# Patient Record
Sex: FEMALE | NOT HISPANIC OR LATINO | URBAN - METROPOLITAN AREA
[De-identification: names, ages, dates, MRNs, and addresses within clinical notes are randomized per-mention and may not be internally consistent; named-entity substitution may affect disease eponyms.]

---

## 2022-03-29 ENCOUNTER — ANESTHESIA EVENT (OUTPATIENT)
Dept: PERIOP | Facility: HOSPITAL | Age: 47
End: 2022-03-29
Payer: COMMERCIAL

## 2022-03-29 RX ORDER — ARIPIPRAZOLE 2 MG/1
2 TABLET ORAL DAILY
COMMUNITY

## 2022-03-29 RX ORDER — CETIRIZINE HYDROCHLORIDE 10 MG/1
TABLET ORAL
COMMUNITY

## 2022-03-29 RX ORDER — SERTRALINE HYDROCHLORIDE 25 MG/1
25 TABLET, FILM COATED ORAL DAILY
COMMUNITY
Start: 2022-01-19

## 2022-03-29 RX ORDER — UBIDECARENONE 75 MG
CAPSULE ORAL DAILY
Status: ON HOLD | COMMUNITY
End: 2022-04-04

## 2022-03-29 RX ORDER — KETOROLAC TROMETHAMINE 10 MG/1
TABLET, FILM COATED ORAL
COMMUNITY
Start: 2022-03-10

## 2022-03-29 RX ORDER — METOCLOPRAMIDE 10 MG/1
TABLET ORAL
COMMUNITY
Start: 2022-03-03

## 2022-03-29 RX ORDER — POTASSIUM CHLORIDE 20 MEQ/1
20 TABLET, EXTENDED RELEASE ORAL DAILY
Status: ON HOLD | COMMUNITY
End: 2022-04-04 | Stop reason: SINTOL

## 2022-03-29 RX ORDER — HYDROCODONE BITARTRATE AND ACETAMINOPHEN 7.5; 325 MG/1; MG/1
1 TABLET ORAL
COMMUNITY

## 2022-03-29 RX ORDER — OMEPRAZOLE 20 MG/1
20 CAPSULE, DELAYED RELEASE ORAL DAILY
Status: ON HOLD | COMMUNITY
End: 2022-04-04

## 2022-03-29 RX ORDER — ONDANSETRON 4 MG/1
TABLET, ORALLY DISINTEGRATING ORAL
COMMUNITY

## 2022-03-29 RX ORDER — FLUTICASONE PROPIONATE 50 MCG
SPRAY, SUSPENSION (ML) NASAL
COMMUNITY
Start: 2022-02-28

## 2022-03-29 RX ORDER — LISINOPRIL 20 MG/1
20 TABLET ORAL DAILY
COMMUNITY
Start: 2022-01-20

## 2022-03-29 RX ORDER — SEMAGLUTIDE 1.34 MG/ML
INJECTION, SOLUTION SUBCUTANEOUS
COMMUNITY
Start: 2022-02-28

## 2022-03-29 RX ORDER — LEUPROLIDE ACETATE 3.75 MG
3.75 KIT INTRAMUSCULAR
COMMUNITY

## 2022-03-29 RX ORDER — OMEPRAZOLE 40 MG/1
CAPSULE, DELAYED RELEASE ORAL
COMMUNITY
Start: 2022-02-22

## 2022-03-29 RX ORDER — FAMOTIDINE 20 MG/1
20 TABLET, FILM COATED ORAL DAILY
COMMUNITY
Start: 2022-03-10

## 2022-03-29 RX ORDER — SUCRALFATE 1 G/1
1 TABLET ORAL 2 TIMES DAILY
COMMUNITY

## 2022-03-29 RX ORDER — ALPRAZOLAM 1 MG/1
1 TABLET ORAL DAILY PRN
COMMUNITY

## 2022-03-29 RX ORDER — CHOLECALCIFEROL (VITAMIN D3) 50 MCG
1 CAPSULE ORAL DAILY
COMMUNITY
Start: 2022-02-22

## 2022-03-29 RX ORDER — HYDROCHLOROTHIAZIDE 12.5 MG/1
CAPSULE, GELATIN COATED ORAL
COMMUNITY
Start: 2022-01-22

## 2022-04-04 ENCOUNTER — HOSPITAL ENCOUNTER (OUTPATIENT)
Facility: HOSPITAL | Age: 47
Setting detail: OUTPATIENT SURGERY
Discharge: HOME | End: 2022-04-04
Attending: STUDENT IN AN ORGANIZED HEALTH CARE EDUCATION/TRAINING PROGRAM | Admitting: STUDENT IN AN ORGANIZED HEALTH CARE EDUCATION/TRAINING PROGRAM
Payer: COMMERCIAL

## 2022-04-04 ENCOUNTER — ANESTHESIA (OUTPATIENT)
Dept: PERIOP | Facility: HOSPITAL | Age: 47
End: 2022-04-04
Payer: COMMERCIAL

## 2022-04-04 VITALS
DIASTOLIC BLOOD PRESSURE: 91 MMHG | BODY MASS INDEX: 27.99 KG/M2 | HEART RATE: 98 BPM | SYSTOLIC BLOOD PRESSURE: 144 MMHG | TEMPERATURE: 97.7 F | RESPIRATION RATE: 20 BRPM | WEIGHT: 168 LBS | OXYGEN SATURATION: 93 % | HEIGHT: 65 IN

## 2022-04-04 DIAGNOSIS — Z85.3 PERSONAL HISTORY OF MALIGNANT NEOPLASM OF BREAST: ICD-10-CM

## 2022-04-04 DIAGNOSIS — N65.0 DEFORMITY OF RECONSTRUCTED BREAST: ICD-10-CM

## 2022-04-04 PROBLEM — C50.919 BREAST CANCER (HCC): Status: ACTIVE | Noted: 2022-04-04

## 2022-04-04 PROBLEM — K44.9 HIATAL HERNIA: Status: ACTIVE | Noted: 2022-04-04

## 2022-04-04 PROBLEM — N18.9 CHRONIC KIDNEY DISEASE: Status: ACTIVE | Noted: 2022-04-04

## 2022-04-04 PROBLEM — N20.0 NEPHROLITHIASIS: Status: ACTIVE | Noted: 2022-04-04

## 2022-04-04 PROBLEM — Z98.890 PONV (POSTOPERATIVE NAUSEA AND VOMITING): Status: ACTIVE | Noted: 2022-04-04

## 2022-04-04 PROBLEM — R11.2 PONV (POSTOPERATIVE NAUSEA AND VOMITING): Status: ACTIVE | Noted: 2022-04-04

## 2022-04-04 PROBLEM — K21.9 GASTROESOPHAGEAL REFLUX DISEASE: Status: ACTIVE | Noted: 2022-04-04

## 2022-04-04 PROBLEM — F41.9 ANXIETY: Status: ACTIVE | Noted: 2022-04-04

## 2022-04-04 PROBLEM — F32.A DEPRESSION: Status: ACTIVE | Noted: 2022-04-04

## 2022-04-04 PROCEDURE — 88300 SURGICAL PATH GROSS: CPT | Performed by: PATHOLOGY

## 2022-04-04 PROCEDURE — 88302 TISSUE EXAM BY PATHOLOGIST: CPT | Performed by: PATHOLOGY

## 2022-04-04 PROCEDURE — L8600 IMPLANT BREAST SILICONE/EQ: HCPCS | Performed by: STUDENT IN AN ORGANIZED HEALTH CARE EDUCATION/TRAINING PROGRAM

## 2022-04-04 PROCEDURE — C1781 MESH (IMPLANTABLE): HCPCS | Performed by: STUDENT IN AN ORGANIZED HEALTH CARE EDUCATION/TRAINING PROGRAM

## 2022-04-04 DEVICE — IMPLANTABLE DEVICE: Type: IMPLANTABLE DEVICE | Site: BREAST | Status: FUNCTIONAL

## 2022-04-04 RX ORDER — SODIUM CHLORIDE, SODIUM LACTATE, POTASSIUM CHLORIDE, CALCIUM CHLORIDE 600; 310; 30; 20 MG/100ML; MG/100ML; MG/100ML; MG/100ML
100 INJECTION, SOLUTION INTRAVENOUS CONTINUOUS
Status: DISCONTINUED | OUTPATIENT
Start: 2022-04-04 | End: 2022-04-04 | Stop reason: HOSPADM

## 2022-04-04 RX ORDER — EXEMESTANE 25 MG/1
25 TABLET ORAL DAILY
COMMUNITY
Start: 2022-01-06

## 2022-04-04 RX ORDER — FENTANYL CITRATE/PF 50 MCG/ML
50 SYRINGE (ML) INJECTION
Status: DISCONTINUED | OUTPATIENT
Start: 2022-04-04 | End: 2022-04-04 | Stop reason: HOSPADM

## 2022-04-04 RX ORDER — BUPIVACAINE HYDROCHLORIDE 2.5 MG/ML
INJECTION, SOLUTION EPIDURAL; INFILTRATION; INTRACAUDAL AS NEEDED
Status: DISCONTINUED | OUTPATIENT
Start: 2022-04-04 | End: 2022-04-04 | Stop reason: HOSPADM

## 2022-04-04 RX ORDER — DEXAMETHASONE SODIUM PHOSPHATE 10 MG/ML
INJECTION, SOLUTION INTRAMUSCULAR; INTRAVENOUS AS NEEDED
Status: DISCONTINUED | OUTPATIENT
Start: 2022-04-04 | End: 2022-04-04

## 2022-04-04 RX ORDER — CEPHALEXIN 500 MG/1
CAPSULE ORAL
COMMUNITY
Start: 2022-03-27

## 2022-04-04 RX ORDER — LIDOCAINE HYDROCHLORIDE 10 MG/ML
0.5 INJECTION, SOLUTION EPIDURAL; INFILTRATION; INTRACAUDAL; PERINEURAL ONCE AS NEEDED
Status: DISCONTINUED | OUTPATIENT
Start: 2022-04-04 | End: 2022-04-04 | Stop reason: HOSPADM

## 2022-04-04 RX ORDER — SUCCINYLCHOLINE/SOD CL,ISO/PF 100 MG/5ML
SYRINGE (ML) INTRAVENOUS AS NEEDED
Status: DISCONTINUED | OUTPATIENT
Start: 2022-04-04 | End: 2022-04-04

## 2022-04-04 RX ORDER — PROPOFOL 10 MG/ML
INJECTION, EMULSION INTRAVENOUS CONTINUOUS PRN
Status: DISCONTINUED | OUTPATIENT
Start: 2022-04-04 | End: 2022-04-04

## 2022-04-04 RX ORDER — MAGNESIUM HYDROXIDE 1200 MG/15ML
LIQUID ORAL AS NEEDED
Status: DISCONTINUED | OUTPATIENT
Start: 2022-04-04 | End: 2022-04-04 | Stop reason: HOSPADM

## 2022-04-04 RX ORDER — TAMSULOSIN HYDROCHLORIDE 0.4 MG/1
0.4 CAPSULE ORAL DAILY
COMMUNITY
Start: 2022-03-17

## 2022-04-04 RX ORDER — TRAZODONE HYDROCHLORIDE 150 MG/1
150 TABLET ORAL
COMMUNITY
Start: 2022-03-04

## 2022-04-04 RX ORDER — PROPOFOL 10 MG/ML
INJECTION, EMULSION INTRAVENOUS AS NEEDED
Status: DISCONTINUED | OUTPATIENT
Start: 2022-04-04 | End: 2022-04-04

## 2022-04-04 RX ORDER — HYDROMORPHONE HCL/PF 1 MG/ML
0.5 SYRINGE (ML) INJECTION
Status: DISCONTINUED | OUTPATIENT
Start: 2022-04-04 | End: 2022-04-04 | Stop reason: HOSPADM

## 2022-04-04 RX ORDER — ACETAMINOPHEN 325 MG/1
650 TABLET ORAL ONCE AS NEEDED
Status: DISCONTINUED | OUTPATIENT
Start: 2022-04-04 | End: 2022-04-04 | Stop reason: HOSPADM

## 2022-04-04 RX ORDER — ONDANSETRON 2 MG/ML
4 INJECTION INTRAMUSCULAR; INTRAVENOUS ONCE AS NEEDED
Status: DISCONTINUED | OUTPATIENT
Start: 2022-04-04 | End: 2022-04-04 | Stop reason: HOSPADM

## 2022-04-04 RX ORDER — HYDROMORPHONE HCL/PF 1 MG/ML
SYRINGE (ML) INJECTION AS NEEDED
Status: DISCONTINUED | OUTPATIENT
Start: 2022-04-04 | End: 2022-04-04

## 2022-04-04 RX ORDER — SCOLOPAMINE TRANSDERMAL SYSTEM 1 MG/1
1 PATCH, EXTENDED RELEASE TRANSDERMAL
Status: DISCONTINUED | OUTPATIENT
Start: 2022-04-04 | End: 2022-04-04 | Stop reason: HOSPADM

## 2022-04-04 RX ORDER — DEXAMETHASONE SODIUM PHOSPHATE 4 MG/ML
INJECTION, SOLUTION INTRA-ARTICULAR; INTRALESIONAL; INTRAMUSCULAR; INTRAVENOUS; SOFT TISSUE AS NEEDED
Status: DISCONTINUED | OUTPATIENT
Start: 2022-04-04 | End: 2022-04-04 | Stop reason: HOSPADM

## 2022-04-04 RX ORDER — FENTANYL CITRATE 50 UG/ML
INJECTION, SOLUTION INTRAMUSCULAR; INTRAVENOUS AS NEEDED
Status: DISCONTINUED | OUTPATIENT
Start: 2022-04-04 | End: 2022-04-04

## 2022-04-04 RX ORDER — LIDOCAINE HYDROCHLORIDE 10 MG/ML
INJECTION, SOLUTION EPIDURAL; INFILTRATION; INTRACAUDAL; PERINEURAL AS NEEDED
Status: DISCONTINUED | OUTPATIENT
Start: 2022-04-04 | End: 2022-04-04

## 2022-04-04 RX ORDER — POTASSIUM CITRATE 10 MEQ/1
10 TABLET, EXTENDED RELEASE ORAL 2 TIMES DAILY
COMMUNITY
Start: 2021-12-28

## 2022-04-04 RX ORDER — OXYCODONE HYDROCHLORIDE 5 MG/1
5 TABLET ORAL ONCE AS NEEDED
Status: DISCONTINUED | OUTPATIENT
Start: 2022-04-04 | End: 2022-04-04 | Stop reason: HOSPADM

## 2022-04-04 RX ORDER — DIPHENOXYLATE HYDROCHLORIDE AND ATROPINE SULFATE 2.5; .025 MG/1; MG/1
TABLET ORAL
COMMUNITY
Start: 2022-02-28

## 2022-04-04 RX ORDER — METOCLOPRAMIDE HYDROCHLORIDE 5 MG/ML
10 INJECTION INTRAMUSCULAR; INTRAVENOUS ONCE AS NEEDED
Status: DISCONTINUED | OUTPATIENT
Start: 2022-04-04 | End: 2022-04-04 | Stop reason: HOSPADM

## 2022-04-04 RX ORDER — FENTANYL CITRATE/PF 50 MCG/ML
25 SYRINGE (ML) INJECTION
Status: DISCONTINUED | OUTPATIENT
Start: 2022-04-04 | End: 2022-04-04 | Stop reason: HOSPADM

## 2022-04-04 RX ORDER — DIPHENHYDRAMINE HYDROCHLORIDE 50 MG/ML
12.5 INJECTION INTRAMUSCULAR; INTRAVENOUS ONCE AS NEEDED
Status: DISCONTINUED | OUTPATIENT
Start: 2022-04-04 | End: 2022-04-04 | Stop reason: HOSPADM

## 2022-04-04 RX ORDER — CEFAZOLIN SODIUM 1 G/50ML
1000 SOLUTION INTRAVENOUS ONCE
Status: COMPLETED | OUTPATIENT
Start: 2022-04-04 | End: 2022-04-04

## 2022-04-04 RX ORDER — GABAPENTIN 300 MG/1
300 CAPSULE ORAL 2 TIMES DAILY
COMMUNITY

## 2022-04-04 RX ORDER — MIDAZOLAM HYDROCHLORIDE 2 MG/2ML
INJECTION, SOLUTION INTRAMUSCULAR; INTRAVENOUS AS NEEDED
Status: DISCONTINUED | OUTPATIENT
Start: 2022-04-04 | End: 2022-04-04

## 2022-04-04 RX ORDER — ONDANSETRON 2 MG/ML
INJECTION INTRAMUSCULAR; INTRAVENOUS AS NEEDED
Status: DISCONTINUED | OUTPATIENT
Start: 2022-04-04 | End: 2022-04-04

## 2022-04-04 RX ADMIN — PROPOFOL 100 MCG/KG/MIN: 10 INJECTION, EMULSION INTRAVENOUS at 12:20

## 2022-04-04 RX ADMIN — SCOPALAMINE 1 PATCH: 1 PATCH, EXTENDED RELEASE TRANSDERMAL at 12:02

## 2022-04-04 RX ADMIN — REMIFENTANIL HYDROCHLORIDE 0.2 MCG/KG/MIN: 1 INJECTION, POWDER, LYOPHILIZED, FOR SOLUTION INTRAVENOUS at 15:01

## 2022-04-04 RX ADMIN — ONDANSETRON 4 MG: 2 INJECTION INTRAMUSCULAR; INTRAVENOUS at 15:49

## 2022-04-04 RX ADMIN — FENTANYL CITRATE 100 MCG: 50 INJECTION, SOLUTION INTRAMUSCULAR; INTRAVENOUS at 12:14

## 2022-04-04 RX ADMIN — CEFAZOLIN SODIUM 1000 MG: 1 SOLUTION INTRAVENOUS at 12:07

## 2022-04-04 RX ADMIN — Medication 100 MG: at 12:19

## 2022-04-04 RX ADMIN — SODIUM CHLORIDE, SODIUM LACTATE, POTASSIUM CHLORIDE, AND CALCIUM CHLORIDE: .6; .31; .03; .02 INJECTION, SOLUTION INTRAVENOUS at 15:58

## 2022-04-04 RX ADMIN — DEXAMETHASONE SODIUM PHOSPHATE 10 MG: 10 INJECTION, SOLUTION INTRAMUSCULAR; INTRAVENOUS at 12:20

## 2022-04-04 RX ADMIN — PROPOFOL 200 MG: 10 INJECTION, EMULSION INTRAVENOUS at 12:19

## 2022-04-04 RX ADMIN — MIDAZOLAM 2 MG: 1 INJECTION INTRAMUSCULAR; INTRAVENOUS at 12:10

## 2022-04-04 RX ADMIN — FENTANYL CITRATE 50 MCG: 50 INJECTION, SOLUTION INTRAMUSCULAR; INTRAVENOUS at 16:24

## 2022-04-04 RX ADMIN — PROPOFOL 50 MG: 10 INJECTION, EMULSION INTRAVENOUS at 12:27

## 2022-04-04 RX ADMIN — PROPOFOL 50 MG: 10 INJECTION, EMULSION INTRAVENOUS at 13:28

## 2022-04-04 RX ADMIN — REMIFENTANIL HYDROCHLORIDE 0.1 MCG/KG/MIN: 1 INJECTION, POWDER, LYOPHILIZED, FOR SOLUTION INTRAVENOUS at 12:42

## 2022-04-04 RX ADMIN — PROPOFOL 50 MG: 10 INJECTION, EMULSION INTRAVENOUS at 13:52

## 2022-04-04 RX ADMIN — PROPOFOL 150 MCG/KG/MIN: 10 INJECTION, EMULSION INTRAVENOUS at 13:55

## 2022-04-04 RX ADMIN — SODIUM CHLORIDE, SODIUM LACTATE, POTASSIUM CHLORIDE, AND CALCIUM CHLORIDE: .6; .31; .03; .02 INJECTION, SOLUTION INTRAVENOUS at 12:15

## 2022-04-04 RX ADMIN — PROPOFOL 50 MG: 10 INJECTION, EMULSION INTRAVENOUS at 14:04

## 2022-04-04 RX ADMIN — HYDROMORPHONE HYDROCHLORIDE 0.5 MG: 1 INJECTION, SOLUTION INTRAMUSCULAR; INTRAVENOUS; SUBCUTANEOUS at 12:29

## 2022-04-04 RX ADMIN — HYDROMORPHONE HYDROCHLORIDE 0.5 MG: 1 INJECTION, SOLUTION INTRAMUSCULAR; INTRAVENOUS; SUBCUTANEOUS at 16:44

## 2022-04-04 RX ADMIN — FENTANYL CITRATE 50 MCG: 50 INJECTION, SOLUTION INTRAMUSCULAR; INTRAVENOUS at 16:33

## 2022-04-04 RX ADMIN — SODIUM CHLORIDE, SODIUM LACTATE, POTASSIUM CHLORIDE, AND CALCIUM CHLORIDE: .6; .31; .03; .02 INJECTION, SOLUTION INTRAVENOUS at 14:11

## 2022-04-04 RX ADMIN — CEFAZOLIN SODIUM 1000 MG: 1 SOLUTION INTRAVENOUS at 12:15

## 2022-04-04 RX ADMIN — FENTANYL CITRATE 100 MCG: 50 INJECTION, SOLUTION INTRAMUSCULAR; INTRAVENOUS at 12:29

## 2022-04-04 RX ADMIN — HYDROMORPHONE HYDROCHLORIDE 0.5 MG: 1 INJECTION, SOLUTION INTRAMUSCULAR; INTRAVENOUS; SUBCUTANEOUS at 16:58

## 2022-04-04 RX ADMIN — LIDOCAINE HYDROCHLORIDE 50 MG: 10 INJECTION, SOLUTION EPIDURAL; INFILTRATION; INTRACAUDAL; PERINEURAL at 12:19

## 2022-04-04 RX ADMIN — PROPOFOL 50 MG: 10 INJECTION, EMULSION INTRAVENOUS at 12:24

## 2022-04-04 NOTE — ANESTHESIA POSTPROCEDURE EVALUATION
Post-Op Assessment Note    CV Status:  Stable  Pain Score: 5    Pain management: adequate     Mental Status:  Alert and awake   Hydration Status:  Euvolemic   PONV Controlled:  Controlled   Airway Patency:  Patent      Post Op Vitals Reviewed: Yes      Staff: Anesthesiologist         No complications documented      /85 (04/04/22 1617)    Temp 99 6 °F (37 6 °C) (04/04/22 1617)    Pulse (!) 109 (04/04/22 1617)   Resp 20 (04/04/22 1617)    SpO2 94 % (04/04/22 1617)

## 2022-04-04 NOTE — OP NOTE
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]     * Personal history of malignant neoplasm of breast [Z85 3]    Procedure(s) (LRB):  BREAST RECONSTRUCTION REVISION, NEW POCKET PLACEMENT OF IMPLANT, CAPSULORRHAPY, GALAFLEX (Bilateral)    Specimen(s):  ID Type Source Tests Collected by Time Destination   1 : RIGHT BREAST IMPLANT  Other Surgical Hardware/Implant TISSUE EXAM Sarah Badillo, DO 2022 1244    2 : LEFT BREAST IMPLANT Other Surgical Hardware/Implant TISSUE EXAM Andei Crisostomo, DO 2022 1247    3 : LEFT BREAST TISSUE Tissue Breast, Left TISSUE EXAM Andie Crisostomo, DO 2022 1308    4 : RIGHT BREAST TISSUE Tissue Breast, Right TISSUE EXAM Andie Crisostomo, DO 2022 1424        Estimated Blood Loss:   50 mL    Drains:  Closed/Suction Drain Left Breast Bulb 10 Fr  (Active)   Drainage Appearance Bloody 22 1617   Status To bulb suction 22 1617   Number of days: 0       Closed/Suction Drain Right Breast Bulb 10 Fr  (Active)   Drainage Appearance Bloody 22 1617   Status To bulb suction 22 1617   Number of days: 0       [REMOVED] Urethral Catheter Latex 16 Fr  (Removed)   Number of days: 0       Anesthesia Type:   General, TIVA    Operative Indications:  51 yo female presents with asymmetry and deformity of her reconstructed breasts    Operative Findings:  590 cc sientra and 470 cc sientra implants removed from left and right respectively; capsulectomy performed; galaflex to reinforce the IMF and breast borders; implants moved to prepectoral plane    Right breast allergan natrelle smooth inspira 545cc SN [id number]  Left breast allergan natrelle smooth inspira 615 cc SN [id number]    Complications:   None    Procedure and Technique:  The patient was seen preoperatively   The procedure, risks, benefits and alternatives were discussed   Informed consent was obtained   The patient was site marked preoperatively   The patient was brought to the operating room where she was positioned supine with all of her pressure points appropriately padded   Anesthesia commenced   A timeout was performed and verified      The patient was prepped and draped in usual sterile fashion   I first turned my attention to the left side   The marked former incision was excised and the subcutaneous tissue was dissected down to the pectoralis muscle  A prepectoral plane was cautiously dissected  The skin flaps appeared healthy and well perfused   Next, the implant was removed   The capsule was soft and without any obvious abnormality   I first performed capsulectomy and partial capsulotomy and used 2-0 PDS and the capsule to reinforce the inframammary fold and lateral breast border and well as reattach the pectoralis muscle into position   Next, the galaflex mesh was sewn in medially, inferiorly and laterally using 2-0 PDS   A sizer was used during this process to ensure appropriate position and size   The patient was then sat up and the excess skin was tailor tacked  Salarianna Hansen was returned supine and the excess skin was excised   The pocket was copiously irrigated and hemostasis was obtained   Under sterile conditions, the final implant was placed    The superior border of the galaflex was tacked to the skin flap using 3-0 monocryl   A 10 Fr drain was placed between the skin flaps and galaflex and secured in place using 2-0 nylon   The superior skin flap was de-epithelialized and the excess soft tissue was used to buttress the IMF  The deep dermis and skin were approximated using 3-0 monocryl  and 4-0 PDS   The same procedure was performed on the right   The patient's skin was cleansed, dried and glue applied   A biopatch and tegaderm were placed around the drain   Fluffs and my surgical bra were applied      The instrument, sponge and needle count was correct an verified prior to completion of the case      The patient emerged from anesthesia and was transferred to the recovery room in stable condition  Patient Disposition:  PACU       SIGNATURE: Jose Willams DO  DATE: April 4, 2022  TIME: 4:43 PM    No qualified resident was available for the case  The PA provided assistance with retraction and suturing

## 2022-04-04 NOTE — INTERVAL H&P NOTE
H&P reviewed  After examining the patient I find no changes in the patients condition since the H&P had been written      Vitals:    04/04/22 0959   BP: 138/84   Pulse: 96   Resp: 20   Temp: 98 9 °F (37 2 °C)   SpO2: 99%

## 2022-04-04 NOTE — ANESTHESIA PREPROCEDURE EVALUATION
Procedure:  BREAST RECONSTRUCTION REVISION, NEW POCKET PLACEMENT OF IMPLANT, CAPSULORRHAPY, GALAFLEX (Bilateral Breast)    Relevant Problems   ANESTHESIA   (+) PONV (postoperative nausea and vomiting)      GI/HEPATIC   (+) Gastroesophageal reflux disease   (+) Hiatal hernia      /RENAL   (+) Chronic kidney disease   (+) Nephrolithiasis      GYN   (+) Breast cancer (HCC)      NEURO/PSYCH   (+) Anxiety   (+) Depression     Cr 3/16/22: 1 0     PFT 11/3/21: The FVC is normal  The FEV1 is normal  The FEV1/FVC is normal  There is no   significant bronchodilator response  The total lung capacity is increased  The    diffusing capacity is normal INTERPRETATION: Spirometry is normal  There is   no significant bronchodilator response  The total lung capacity is increased  The normal diffusing capacity indicates that gas exchange is preserved  This   interpretation has been electronically signed: Jade Arana DR  12/08/2021     09:09:28 AM    Physical Exam    Airway    Mallampati score: II  TM Distance: >3 FB  Neck ROM: full     Dental       Cardiovascular      Pulmonary      Other Findings        Anesthesia Plan  ASA Score- 2     Anesthesia Type- general with ASA Monitors  Additional Monitors:   Airway Plan: ETT  Plan Factors-Exercise tolerance (METS): >4 METS  Chart reviewed  Existing labs reviewed  Patient summary reviewed  Patient is not a current smoker  Patient did not smoke on day of surgery  Induction- intravenous  Postoperative Plan- Plan for postoperative opioid use  Planned trial extubation    Informed Consent- Anesthetic plan and risks discussed with patient  I personally reviewed this patient with the CRNA  Discussed and agreed on the Anesthesia Plan with the CRNA  Kayy Cunningham

## 2022-04-04 NOTE — ANESTHESIA POSTPROCEDURE EVALUATION
Post-Op Assessment Note    CV Status:  Stable  Pain Score: 0    Pain management: adequate     Mental Status:  Alert   PONV Controlled:  Controlled   Airway Patency:  Patent   Two or more mitigation strategies used for obstructive sleep apnea   Post Op Vitals Reviewed: Yes      Staff: CRNA         No complications documented      /85 (04/04/22 1617)    Temp 99 6 °F (37 6 °C) (04/04/22 1617)    Pulse (!) 109 (04/04/22 1617)   Resp 20 (04/04/22 1617)    SpO2 94 % (04/04/22 1617)

## 2022-04-04 NOTE — DISCHARGE INSTR - AVS FIRST PAGE
Surgery Date: 4/4/2022                Patient: Holger Cook  Surgeon: Frandy Calderón, DO     Postoperative Instructions   Breast Reconstruction     Dressings:  [x] Skin glue was applied to your incision over absorbable sutures  You may feel small pieces of suture at the ends of your incision  [x] Remove dressing the second morning following your surgery and bathe as directed  Please leave tegaderm (clear dressing over your drains) in place  [x] No dressings are required but you may cover the incision with gauze for comfort  [x] Wear your surgical bra at all times except while showering  [x] Strip and record your RAIANA drain output as instructed  Be sure to bring the output log to your follow up appointment  [] Other instructions:      Bathing:  [x] Shower 48 hours after surgery  Allow soap and water to gently wash over the incision  No scrubbing  Gently pat dry and apply dressing as needed/instructed above  [x] No submerging incision in bathtub, pool, hot tub and/or lake  Activity:  [x] No heavy lifting (> 10lbs)  [x] No strenuous exercise  [x] Walking is mandatory daily  [x] Sleeping may be more comfortable with your head elevated  [x] No driving until off pain medications and you have resumed full range of motion  Diet and Medication:  [x] Resume diet as tolerated  High protein diet is important for healing  Remain well hydrated with water and minimize sodium intake  [x] Resume preoperative medications  [x] Pain medications as prescribed  You may also begin to use ibuprofen 48 hours after surgery  [x] Finish all antibiotics as prescribed  [x] Apply ice to area as needed for pain  Do not place ice directly on skin  Do not use heat  [] Other instructions: It is expected to have some bruising, swelling and mild oozing at the incision site and of the surrounding area    If there is more than you expect, an enlarging area or you suspect an infection, please call the office  Some patients may experience a low-grade fever after surgery  If it is above 100 4, please call the office  If you do not have a postoperative office appointment scheduled, please call the office today and let the staff know Dr Anastasiia West needs to see you in 5-7 days  Please call [phone] with any questions, concerns or changes        ARIANA Drain Output Log     Date Time Drain #1 Drain #2

## 2022-09-21 ENCOUNTER — COSMETIC (OUTPATIENT)
Dept: PLASTIC SURGERY | Facility: CLINIC | Age: 47
End: 2022-09-21
Payer: COMMERCIAL

## 2022-09-21 DIAGNOSIS — N65.0 DEFORMITY OF RECONSTRUCTED BREAST: ICD-10-CM

## 2022-09-21 DIAGNOSIS — Z41.1 ENCOUNTER FOR COSMETIC SURGERY: Primary | ICD-10-CM

## 2022-09-21 PROCEDURE — 99214 OFFICE O/P EST MOD 30 MIN: CPT | Performed by: STUDENT IN AN ORGANIZED HEALTH CARE EDUCATION/TRAINING PROGRAM

## 2022-09-21 RX ORDER — LISINOPRIL 10 MG/1
TABLET ORAL
COMMUNITY
Start: 2022-09-07

## 2022-09-21 RX ORDER — PHENTERMINE HYDROCHLORIDE 15 MG/1
15 CAPSULE ORAL DAILY
COMMUNITY
Start: 2022-08-16

## 2022-09-21 RX ORDER — ALBUTEROL SULFATE 2.5 MG/3ML
2.5 SOLUTION RESPIRATORY (INHALATION)
COMMUNITY
Start: 2021-10-12 | End: 2022-10-12

## 2022-09-21 RX ORDER — DEXAMETHASONE 4 MG/1
TABLET ORAL
COMMUNITY
Start: 2022-07-11

## 2022-09-21 NOTE — PROGRESS NOTES
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  I am unable to address this  My hope is that a diastasis repair will improve upon this contour but the more weight loss she obtains, the better her results will be  I also explained that given her prior excision from her abdomen as well as liposuction for fat grafting, she has a high risk of a vertical incision as well as contour irregularities  Will provide a quote for the cosmetic portion of the procedure and will aim for a goal surgery date of mid-December  History of Present Illness:   Ms Kenia Moy is a 50 y o  female well known to me  I performed a revision breast reconstruction as she had a prior recons x 14 by another surgeon  At one point, she had a fat transfer but this surgeon also performed was seems to be a mini-abdominoplasty as she has a transverse incision  She is unhappy with the contour of her abdomen  She is a non-nicotine user  Review of Systems:  A 12 point ROS was performed and negative except per HPI  Past Medical History:  Past Medical History:   Diagnosis Date    Anxiety     Cancer [facility])     breast    Chronic kidney disease     Depression     Gastric dysmotility     delayed gastric emptying    GERD (gastroesophageal reflux disease)     Hiatal hernia     Hypertension     Kidney stone     Lymphedema     PONV (postoperative nausea and vomiting)     PPD positive     +PPD test history   travel to [facility]       Past Surgical History:  Past Surgical History:   Procedure Laterality Date    BREAST RECONSTRUCTION      x14    BREAST RECONSTRUCTION Bilateral 4/4/2022    Procedure: BREAST RECONSTRUCTION REVISION, NEW POCKET PLACEMENT OF IMPLANT, CAPSULORRHAPY, GALAFLEX;  Surgeon: Romayne Russell, DO;  Location:  MAIN OR;  Service: Plastics    CHOLECYSTECTOMY      COSMETIC SURGERY      CYSTOSCOPY W/ URETEROSCOPY W/ LITHOTRIPSY      EGD      MASTECTOMY Bilateral        Social History:  Social History     Tobacco Use    Smoking status: Never Smoker    Smokeless tobacco: Never Used   Vaping Use    Vaping Use: Never used   Substance Use Topics    Alcohol use: Not Currently    Drug use: Never       Family History:  Family History   Problem Relation Age of Onset    Stroke Mother     Heart attack Father        Allergies: Allergies   Allergen Reactions    Other Other (See Comments)     Allergic Chloraprep blisters    Medical Tape Blisters and Rash     Can use paper tape    Iodinated Diagnostic Agents Hives    Chlorhexidine Rash       Medications:  Current Outpatient Medications on File Prior to Visit   Medication Sig Dispense Refill    albuterol (2 5 mg/3 mL) 0 083 % nebulizer solution Inhale 2 5 mg      ALPRAZolam (XANAX) 1 mg tablet Take 1 mg by mouth daily as needed      ARIPiprazole (ABILIFY) 2 mg tablet Take 2 mg by mouth daily      cephalexin (KEFLEX) 500 mg capsule       cetirizine (ZyrTEC) 10 mg tablet cetirizine 10 mg tablet   TAKE 1 TABLET EVERY DAY BY ORAL ROUTE AS DIRECTED FOR 90 DAYS   D3 Super Strength 50 MCG (2000 UT) CAPS Take 1 capsule by mouth daily As directed      denosumab (PROLIA) 60 mg/mL Inject 60 mg under the skin      dexamethasone (DECADRON) 4 mg tablet TAKE 1 TABLET BY MOUTH EVERY DAY AS DIRECTED FOR 5 DAYS      diphenoxylate-atropine (LOMOTIL) 2 5-0 025 mg per tablet TAKE 1 TABLETS 4 TIMES A DAY BY ORAL ROUTE AS DIRECTED FOR 90 DAYS        exemestane (AROMASIN) 25 MG tablet Take 25 mg by mouth daily      famotidine (PEPCID) 20 mg tablet Take 20 mg by mouth daily      fluticasone (FLONASE) 50 mcg/act nasal spray SPRAY 2 SPRAYS EVERY DAY BY INTRANASAL ROUTE AS DIRECTED FOR 30 DAYS   gabapentin (NEURONTIN) 300 mg capsule Take 300 mg by mouth 2 (two) times a day      hydrochlorothiazide (MICROZIDE) 12 5 mg capsule TAKE 1 TAB ORALLY DAILY      HYDROcodone-acetaminophen (NORCO) 7 5-325 mg per tablet Take 1 tablet by mouth      ketorolac (TORADOL) 10 mg tablet TAKE 1 TABLET BY MOUTH EVERY 8 HOURS FOR 5 DAYS      leuprolide (Lupron Dep[place], 1-Month,) 3 75 mg injection Inject 3 75 mg into a muscle      lisinopril (ZESTRIL) 10 mg tablet       lisinopril (ZESTRIL) 20 mg tablet Take 20 mg by mouth daily As directed      metoclopramide (REGLAN) 10 mg tablet TAKE 1 TABLET 4 TIMES A DAY BY ORAL ROUTE AS DIRECTED FOR 90 DAYS   omeprazole (PriLOSEC) 40 MG capsule TAKE 1 CAPSULE BY MOUTH EVERY DAY AS DIRECTED FOR 90 DAYS      ondansetron (ZOFRAN-ODT) 4 mg disintegrating tablet PLACE 1 TAB ON TONGUE EVERY 8 HOURS AS NEEDED FOR NAUSEA FOR UP TO 30 DAYS  DISSOLVE ON TONGUE       Ozempic, 0 25 or 0 5 MG/DOSE, 2 MG/1 5ML SOPN INJECT 0 25 MG EVERY WEEK BY SUBCUTANEOUS ROUTE AS DIRECTED   phentermine 15 MG capsule Take 15 mg by mouth daily      potassium citrate (UROCIT-K) 10 mEq Take 10 mEq by mouth 2 (two) times a day      sertraline (ZOLOFT) 25 mg tablet Take 25 mg by mouth daily      sucralfate (CARAFATE) 1 g tablet Take 1 g by mouth 2 (two) times a day      tamsulosin (FLOMAX) 0 4 mg Take 0 4 mg by mouth daily      traZODone (DESYREL) 150 mg tablet Take 150 mg by mouth daily at bedtime       No current facility-administered medications on file prior to visit  Physical Examination:  There were no vitals taken for this visit  Estimated body mass index is 27 96 kg/m² as calculated from the following:    Height as of 4/4/22: 5' 5" (1 651 m)  Weight as of 4/4/22: 76 2 kg (168 lb)    General: NAD, well appearing, AAOx3  HEENT: NCAT, EOMI, MMM, supple  Resp: Nonlabored  Heart: RRR  Abdomen: Soft, intra-abdominal distension, well healed transverse and laparoscopic incisions  Extremities/MSK: no LE edema, no obvious deficits in ROM  Neuro: grossly intact with no obvious deficits  Skin: no obvious lesions or rashes  Breast: no palpable mass, no palpable axillary lymphadenopathy, implants soft, small standing cone deformities of medial incisions    Andie Crisostomo, DO  Plastic and Reconstructive Surgery    I have spent 30 minutes with Patient  today in which greater than 50% of this time was spent in counseling/coordination of care regarding Prognosis, Risks and benefits of tx options, Importance of tx compliance, Risk factor reductions and Impressions

## 2022-10-06 ENCOUNTER — PREP FOR PROCEDURE (OUTPATIENT)
Dept: PLASTIC SURGERY | Facility: CLINIC | Age: 47
End: 2022-10-06

## 2022-10-06 DIAGNOSIS — N65.0 DEFORMITY OF RECONSTRUCTED BREAST: ICD-10-CM

## 2022-10-06 DIAGNOSIS — Z41.1 ENCOUNTER FOR COSMETIC SURGERY: Primary | ICD-10-CM

## 2022-10-06 DIAGNOSIS — Z85.3 PERSONAL HISTORY OF MALIGNANT NEOPLASM OF BREAST: ICD-10-CM

## 2022-11-09 ENCOUNTER — OFFICE VISIT (OUTPATIENT)
Dept: PLASTIC SURGERY | Facility: CLINIC | Age: 47
End: 2022-11-09

## 2022-11-09 DIAGNOSIS — Z41.1 ENCOUNTER FOR COSMETIC SURGERY: Primary | ICD-10-CM

## 2022-11-29 NOTE — PROGRESS NOTES
Patient seen and examined  She understands that due to her intra-abdominal lipodystrophy, she still may have a more protuberant appearance  She also understands that her prior operations will effect the mobility of her tissue and this may require a vertical component of her incision  We discussed the procedure, risks, benefits, alternatives and postoperative instructions and expectations  Informed consent obtained  All the patient's questions were answered and she voiced understanding  Andie Crisostomo, DO  Plastic and Reconstructive Surgery

## 2022-12-06 ENCOUNTER — ANESTHESIA EVENT (OUTPATIENT)
Dept: PERIOP | Facility: HOSPITAL | Age: 47
End: 2022-12-06

## 2022-12-06 DIAGNOSIS — Z41.1 ELECTIVE PROCEDURE FOR UNACCEPTABLE COSMETIC APPEARANCE: Primary | ICD-10-CM

## 2022-12-06 RX ORDER — GABAPENTIN 300 MG/1
300 CAPSULE ORAL 3 TIMES DAILY
Qty: 15 CAPSULE | Refills: 0 | Status: SHIPPED | OUTPATIENT
Start: 2022-12-06 | End: 2022-12-11

## 2022-12-06 RX ORDER — TRAMADOL HYDROCHLORIDE 50 MG/1
50 TABLET ORAL EVERY 6 HOURS PRN
Qty: 20 TABLET | Refills: 0 | Status: SHIPPED | OUTPATIENT
Start: 2022-12-06

## 2022-12-06 RX ORDER — SENNOSIDES 8.6 MG
650 CAPSULE ORAL EVERY 6 HOURS
Qty: 20 TABLET | Refills: 0 | Status: SHIPPED | OUTPATIENT
Start: 2022-12-06 | End: 2022-12-11

## 2022-12-06 RX ORDER — OXYCODONE HYDROCHLORIDE 5 MG/1
5 TABLET ORAL EVERY 6 HOURS PRN
Qty: 10 TABLET | Refills: 0 | Status: SHIPPED | OUTPATIENT
Start: 2022-12-06

## 2022-12-06 RX ORDER — IBUPROFEN 800 MG/1
800 TABLET ORAL EVERY 8 HOURS PRN
Qty: 15 TABLET | Refills: 0 | Status: SHIPPED | OUTPATIENT
Start: 2022-12-06

## 2022-12-06 NOTE — PRE-PROCEDURE INSTRUCTIONS
Pre-Surgery Instructions:   Medication Instructions   • ALPRAZolam (XANAX) 1 mg tablet Uses PRN- OK to take day of surgery   • ARIPiprazole (ABILIFY) 2 mg tablet Take day of surgery  • denosumab (PROLIA) 60 mg/mL Hold day of surgery  • diphenoxylate-atropine (LOMOTIL) 2 5-0 025 mg per tablet Hold day of surgery  • exemestane (AROMASIN) 25 MG tablet Take day of surgery  • famotidine (PEPCID) 20 mg tablet Take day of surgery  • leuprolide (Lupron Depot, 1-Month,) 3 75 mg injection Hold day of surgery  • lisinopril (ZESTRIL) 10 mg tablet Hold day of surgery  • omeprazole (PriLOSEC) 40 MG capsule Take day of surgery  • ondansetron (ZOFRAN-ODT) 4 mg disintegrating tablet Uses PRN- OK to take day of surgery   • Ozempic, 0 25 or 0 5 MG/DOSE, 2 MG/1 5ML SOPN Hold day of surgery  • phentermine 15 MG capsule Hold day of surgery  • sertraline (ZOLOFT) 25 mg tablet Take day of surgery  • sucralfate (CARAFATE) 1 g tablet Take day of surgery  • traZODone (DESYREL) 150 mg tablet Take night before surgery    Pt denies fever, sob, sore throat and cough  Pt instructed to stop nsaids and supplements one week prior to surgery  Pt was instructed to use dial soap since she is allergic to chlorhexidine

## 2022-12-07 NOTE — H&P
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 0]       Personal history of malignant neoplasm of breast [Z85 3]  Plan:   ABDOMINOPLASTY (Abdomen)       REVISION OF BILATERAL MEDIAL BREAST STANDING CONE DEFORMITY (Bilateral: Breast)       LIPOSUCTION TO BACK & FLANKS (Back)        HPI:   Jadon Leon is a 50y o  year old female who presents s/p revision of breast reconstruction as she had a prior recons x 14 by another surgeon  At one point, she had a fat transfer but this surgeon also performed was seems to be a mini-abdominoplasty as she has a transverse incision  A majority of her unacceptable contour is related to intra-abdominal lipodystrophy  She is unhappy with the contour of her abdomen  She is a non-nicotine user  REVIEW OF SYSTEMS    GENERAL/CONSTITUTIONAL: The patient denies fever, fatigue, weakness, weight gain or weight loss  HEAD, EYES, EARS, NOSE AND THROAT: Eyes - The patient denies pain, redness, loss of vision, double or blurred vision and denies wearing glasses  The patient denies ringing in the ears, nosebleeds sinusitis, post nasal drip  Also denies frequent sore throats, hoarseness, painful swallowing  CARDIOVASCULAR: The patient denies chest pain, irregular heartbeats, palpitations, shortness of breath, heart murmurs, high blood pressure, cramps in his legs with walking, pain in his feet or toes at night or varicose veins  RESPIRATORY: The patient denies chronic cough, wheezing or night sweats  GASTROINTESTINAL: The patient denies decreased appetite, nausea, vomiting, diarrhea, constipation, blood in the stools  GENITOURINARY: The patient denies difficult urination, pain or burning with urination, blood in the urine  MUSCULOSKELETAL: The patient denies arm, thigh or calf cramps  No joint or muscle pain  No muscle weakness or tenderness  No joint swelling, neck pain, back pain or major orthopedic injuries  SKIN AND BREASTS: see hpi The patient denies easy bruising, skin redness, skin rash, hives  NEUROLOGIC: The patient denies headache, dizziness, fainting, memory loss  PSYCHIATRIC: The patient denies depression anxiety  ENDOCRINE: The patient denies intolerance to hot or cold temperature, flushing, fingernail changes, increased thirst, increased salt intake or decreased sexual desire  HEMATOLOGIC/LYMPHATIC: The patient denies anemia, bleeding tendency or clotting tendency  ALLERGIC/IMMUNOLOGIC: The patient denies rhinitis, asthma, skin sensitivity, latex allergies or sensitivity  Historical Information   Past Medical History:   Diagnosis Date   • Anxiety    • Cancer [facility])     breast   • Chronic kidney disease    • Depression    • Gastric dysmotility     delayed gastric emptying   • GERD (gastroesophageal reflux disease)    • Hiatal hernia    • Hypertension    • Kidney stone    • Lymphedema    • PONV (postoperative nausea and vomiting)    • PPD positive     +PPD test history   travel to [facility]     Past Surgical History:   Procedure Laterality Date   • ABDOMINOPLASTY     • BREAST RECONSTRUCTION      x14   • BREAST RECONSTRUCTION Bilateral 04/04/2022    Procedure: BREAST RECONSTRUCTION REVISION, NEW POCKET PLACEMENT OF IMPLANT, CAPSULORRHAPY, GALAFLEX;  Surgeon: Juan Hdz DO;  Location:  MAIN OR;  Service: Plastics   • CHOLECYSTECTOMY     • COLONOSCOPY     • COSMETIC SURGERY     • CYSTOSCOPY W/ URETEROSCOPY W/ LITHOTRIPSY     • EGD     • MASTECTOMY Bilateral      Social History   Social History     Substance and Sexual Activity   Alcohol Use Yes   • Alcohol/week: 3 0 standard drinks   • Types: 3 Glasses of wine per week     Social History     Substance and Sexual Activity   Drug Use Never     Social History     Tobacco Use   Smoking Status Never   Smokeless Tobacco Never     Family History: Family History   Problem Relation Age of Onset   • Stroke Mother    • Heart attack Father        Meds/Allergies   No current facility-administered medications for this encounter  Current Outpatient Medications:   •  ALPRAZolam (XANAX) 1 mg tablet, Take 1 mg by mouth daily as needed, Disp: , Rfl:   •  ARIPiprazole (ABILIFY) 2 mg tablet, Take 2 mg by mouth daily, Disp: , Rfl:   •  denosumab (PROLIA) 60 mg/mL, Inject 60 mg under the skin, Disp: , Rfl:   •  diphenoxylate-atropine (LOMOTIL) 2 5-0 025 mg per tablet, TAKE 1 TABLETS 4 TIMES A DAY BY ORAL ROUTE AS DIRECTED FOR 90 DAYS , Disp: , Rfl:   •  exemestane (AROMASIN) 25 MG tablet, Take 25 mg by mouth daily, Disp: , Rfl:   •  famotidine (PEPCID) 20 mg tablet, Take 20 mg by mouth daily, Disp: , Rfl:   •  leuprolide (Lupron Depot, 1-Month,) 3 75 mg injection, Inject 3 75 mg into a muscle, Disp: , Rfl:   •  lisinopril (ZESTRIL) 10 mg tablet, daily, Disp: , Rfl:   •  omeprazole (PriLOSEC) 40 MG capsule, TAKE 1 CAPSULE BY MOUTH EVERY DAY AS DIRECTED FOR 90 DAYS, Disp: , Rfl:   •  ondansetron (ZOFRAN-ODT) 4 mg disintegrating tablet, PLACE 1 TAB ON TONGUE EVERY 8 HOURS AS NEEDED FOR NAUSEA FOR UP TO 30 DAYS   DISSOLVE ON TONGUE , Disp: , Rfl:   •  Ozempic, 0 25 or 0 5 MG/DOSE, 2 MG/1 5ML SOPN, INJECT 0 25 MG EVERY WEEK BY SUBCUTANEOUS ROUTE AS DIRECTED , Disp: , Rfl:   •  phentermine 15 MG capsule, Take 15 mg by mouth daily, Disp: , Rfl:   •  sertraline (ZOLOFT) 25 mg tablet, Take 25 mg by mouth daily, Disp: , Rfl:   •  sucralfate (CARAFATE) 1 g tablet, Take 1 g by mouth if needed, Disp: , Rfl:   •  tamsulosin (FLOMAX) 0 4 mg, Take 0 4 mg by mouth if needed, Disp: , Rfl:   •  traZODone (DESYREL) 150 mg tablet, Take 150 mg by mouth daily at bedtime, Disp: , Rfl:   •  acetaminophen (TYLENOL) 650 mg CR tablet, Take 1 tablet (650 mg total) by mouth every 6 (six) hours for 5 days, Disp: 20 tablet, Rfl: 0  •  gabapentin (Neurontin) 300 mg capsule, Take 1 capsule (300 mg total) by mouth 3 (three) times a day for 5 days, Disp: 15 capsule, Rfl: 0  •  ibuprofen (MOTRIN) 800 mg tablet, Take 1 tablet (800 mg total) by mouth every 8 (eight) hours as needed for mild pain (DO NOT BEGIN UNTIL 48 HOURS AFTER SURGERY), Disp: 15 tablet, Rfl: 0  •  oxyCODONE (Roxicodone) 5 immediate release tablet, Take 1 tablet (5 mg total) by mouth every 6 (six) hours as needed for moderate pain Max Daily Amount: 20 mg, Disp: 10 tablet, Rfl: 0  •  traMADol (Ultram) 50 mg tablet, Take 1 tablet (50 mg total) by mouth every 6 (six) hours as needed for moderate pain, Disp: 20 tablet, Rfl: 0      Objective     Estimated body mass index is 27 96 kg/m² as calculated from the following:    Height as of 4/4/22: 5' 5" (1 651 m)  Weight as of 4/4/22: 76 2 kg (168 lb)  General: NAD, well appearing, AAOx3  HEENT: NCAT, EOMI, MMM, supple  Resp: Nonlabored  Heart: RRR  Abdomen: Soft, intra-abdominal distension, well healed transverse and laparoscopic incisions  Extremities/MSK: no LE edema, no obvious deficits in ROM  Neuro: grossly intact with no obvious deficits  Skin: no obvious lesions or rashes  Breast: no palpable mass, no palpable axillary lymphadenopathy, implants soft, small standing cone deformities of medial incisions    Lab Results:   No results found for: WBC, HGB, HCT, MCV, PLT       No results found for: TISSUECULT, WOUNDCULT      Imaging Studies:   No results found  EKG, Pathology, and Other Studies:   Lab Results   Component Value Date/Time    [facility]  04/04/2022 12:44 PM     A  Surgical Hardware/Implant, right breast implant"  -Breast implant   Gross only     B  Surgical Hardware/Implant, left breast implant   -Breast implant   Gross only     C  Breast, Left, left breast tissue"  -Benign fibrofatty breast tissue with fibrous capsule of Implant   -Benign overlying skin    D  Breast, Right, right breast tissue"     -Benign fibrofatty breast tissue with fibrous capsule of Implant   -Focus of fat necrosis  -Benign overlying skin             No intake or output data in the 24 hours ending 12/07/22 0750    Invasive Devices     Drain  Duration           Closed/Suction Drain Left Breast Bulb 10 Fr  246 days    Closed/Suction Drain Right Breast Bulb 10 Fr   246 days                VTE Prophylaxis: Sequential compression device (Venodyne)

## 2022-12-08 ENCOUNTER — ANESTHESIA (OUTPATIENT)
Dept: PERIOP | Facility: HOSPITAL | Age: 47
End: 2022-12-08

## 2022-12-08 ENCOUNTER — HOSPITAL ENCOUNTER (OUTPATIENT)
Facility: HOSPITAL | Age: 47
Setting detail: OUTPATIENT SURGERY
Discharge: HOME | End: 2022-12-08
Attending: STUDENT IN AN ORGANIZED HEALTH CARE EDUCATION/TRAINING PROGRAM | Admitting: STUDENT IN AN ORGANIZED HEALTH CARE EDUCATION/TRAINING PROGRAM

## 2022-12-08 VITALS
HEART RATE: 102 BPM | RESPIRATION RATE: 20 BRPM | BODY MASS INDEX: 26.66 KG/M2 | TEMPERATURE: 97.5 F | OXYGEN SATURATION: 96 % | WEIGHT: 160 LBS | DIASTOLIC BLOOD PRESSURE: 90 MMHG | HEIGHT: 65 IN | SYSTOLIC BLOOD PRESSURE: 155 MMHG

## 2022-12-08 DIAGNOSIS — Z41.1 ENCOUNTER FOR COSMETIC SURGERY: ICD-10-CM

## 2022-12-08 DIAGNOSIS — Z85.3 PERSONAL HISTORY OF MALIGNANT NEOPLASM OF BREAST: ICD-10-CM

## 2022-12-08 DIAGNOSIS — N65.0 DEFORMITY OF RECONSTRUCTED BREAST: ICD-10-CM

## 2022-12-08 LAB
EXT PREGNANCY TEST URINE: NEGATIVE
EXT. CONTROL: NORMAL

## 2022-12-08 RX ORDER — DEXAMETHASONE SODIUM PHOSPHATE 10 MG/ML
INJECTION, SOLUTION INTRAMUSCULAR; INTRAVENOUS AS NEEDED
Status: DISCONTINUED | OUTPATIENT
Start: 2022-12-08 | End: 2022-12-08

## 2022-12-08 RX ORDER — ONDANSETRON 2 MG/ML
INJECTION INTRAMUSCULAR; INTRAVENOUS AS NEEDED
Status: DISCONTINUED | OUTPATIENT
Start: 2022-12-08 | End: 2022-12-08

## 2022-12-08 RX ORDER — SODIUM CHLORIDE, SODIUM LACTATE, POTASSIUM CHLORIDE, CALCIUM CHLORIDE 600; 310; 30; 20 MG/100ML; MG/100ML; MG/100ML; MG/100ML
INJECTION, SOLUTION INTRAVENOUS CONTINUOUS PRN
Status: DISCONTINUED | OUTPATIENT
Start: 2022-12-08 | End: 2022-12-08

## 2022-12-08 RX ORDER — MINERAL OIL
OIL (ML) MISCELLANEOUS AS NEEDED
Status: DISCONTINUED | OUTPATIENT
Start: 2022-12-08 | End: 2022-12-08 | Stop reason: HOSPADM

## 2022-12-08 RX ORDER — PROPOFOL 10 MG/ML
INJECTION, EMULSION INTRAVENOUS CONTINUOUS PRN
Status: DISCONTINUED | OUTPATIENT
Start: 2022-12-08 | End: 2022-12-08

## 2022-12-08 RX ORDER — GABAPENTIN 300 MG/1
300 CAPSULE ORAL ONCE
Status: COMPLETED | OUTPATIENT
Start: 2022-12-08 | End: 2022-12-08

## 2022-12-08 RX ORDER — SODIUM CHLORIDE, SODIUM LACTATE, POTASSIUM CHLORIDE, CALCIUM CHLORIDE 600; 310; 30; 20 MG/100ML; MG/100ML; MG/100ML; MG/100ML
125 INJECTION, SOLUTION INTRAVENOUS CONTINUOUS
Status: DISCONTINUED | OUTPATIENT
Start: 2022-12-08 | End: 2022-12-08 | Stop reason: HOSPADM

## 2022-12-08 RX ORDER — OXYCODONE HYDROCHLORIDE 5 MG/1
5 TABLET ORAL ONCE AS NEEDED
Status: COMPLETED | OUTPATIENT
Start: 2022-12-08 | End: 2022-12-08

## 2022-12-08 RX ORDER — HYDROMORPHONE HCL/PF 1 MG/ML
0.25 SYRINGE (ML) INJECTION
Status: COMPLETED | OUTPATIENT
Start: 2022-12-08 | End: 2022-12-08

## 2022-12-08 RX ORDER — FENTANYL CITRATE 50 UG/ML
INJECTION, SOLUTION INTRAMUSCULAR; INTRAVENOUS AS NEEDED
Status: DISCONTINUED | OUTPATIENT
Start: 2022-12-08 | End: 2022-12-08

## 2022-12-08 RX ORDER — ACETAMINOPHEN 325 MG/1
975 TABLET ORAL ONCE
Status: COMPLETED | OUTPATIENT
Start: 2022-12-08 | End: 2022-12-08

## 2022-12-08 RX ORDER — FENTANYL CITRATE/PF 50 MCG/ML
50 SYRINGE (ML) INJECTION
Status: COMPLETED | OUTPATIENT
Start: 2022-12-08 | End: 2022-12-08

## 2022-12-08 RX ORDER — ENOXAPARIN SODIUM 100 MG/ML
40 INJECTION SUBCUTANEOUS ONCE
Status: COMPLETED | OUTPATIENT
Start: 2022-12-08 | End: 2022-12-08

## 2022-12-08 RX ORDER — PROPOFOL 10 MG/ML
INJECTION, EMULSION INTRAVENOUS AS NEEDED
Status: DISCONTINUED | OUTPATIENT
Start: 2022-12-08 | End: 2022-12-08

## 2022-12-08 RX ORDER — SUCCINYLCHOLINE/SOD CL,ISO/PF 100 MG/5ML
SYRINGE (ML) INTRAVENOUS AS NEEDED
Status: DISCONTINUED | OUTPATIENT
Start: 2022-12-08 | End: 2022-12-08

## 2022-12-08 RX ORDER — CEFAZOLIN SODIUM 1 G/3ML
INJECTION, POWDER, FOR SOLUTION INTRAMUSCULAR; INTRAVENOUS AS NEEDED
Status: DISCONTINUED | OUTPATIENT
Start: 2022-12-08 | End: 2022-12-08

## 2022-12-08 RX ORDER — HYDROMORPHONE HCL/PF 1 MG/ML
SYRINGE (ML) INJECTION AS NEEDED
Status: DISCONTINUED | OUTPATIENT
Start: 2022-12-08 | End: 2022-12-08

## 2022-12-08 RX ORDER — MAGNESIUM HYDROXIDE 1200 MG/15ML
LIQUID ORAL AS NEEDED
Status: DISCONTINUED | OUTPATIENT
Start: 2022-12-08 | End: 2022-12-08 | Stop reason: HOSPADM

## 2022-12-08 RX ORDER — CEFAZOLIN SODIUM 1 G/50ML
1000 SOLUTION INTRAVENOUS ONCE
Status: COMPLETED | OUTPATIENT
Start: 2022-12-08 | End: 2022-12-08

## 2022-12-08 RX ORDER — ONDANSETRON 2 MG/ML
4 INJECTION INTRAMUSCULAR; INTRAVENOUS ONCE AS NEEDED
Status: DISCONTINUED | OUTPATIENT
Start: 2022-12-08 | End: 2022-12-08 | Stop reason: HOSPADM

## 2022-12-08 RX ORDER — MIDAZOLAM HYDROCHLORIDE 2 MG/2ML
INJECTION, SOLUTION INTRAMUSCULAR; INTRAVENOUS AS NEEDED
Status: DISCONTINUED | OUTPATIENT
Start: 2022-12-08 | End: 2022-12-08

## 2022-12-08 RX ORDER — SCOLOPAMINE TRANSDERMAL SYSTEM 1 MG/1
1 PATCH, EXTENDED RELEASE TRANSDERMAL
Status: DISCONTINUED | OUTPATIENT
Start: 2022-12-08 | End: 2022-12-08 | Stop reason: HOSPADM

## 2022-12-08 RX ORDER — LIDOCAINE HYDROCHLORIDE 10 MG/ML
INJECTION, SOLUTION EPIDURAL; INFILTRATION; INTRACAUDAL; PERINEURAL AS NEEDED
Status: DISCONTINUED | OUTPATIENT
Start: 2022-12-08 | End: 2022-12-08

## 2022-12-08 RX ADMIN — FENTANYL CITRATE 100 MCG: 50 INJECTION, SOLUTION INTRAMUSCULAR; INTRAVENOUS at 09:46

## 2022-12-08 RX ADMIN — HYDROMORPHONE HYDROCHLORIDE 0.25 MG: 1 INJECTION, SOLUTION INTRAMUSCULAR; INTRAVENOUS; SUBCUTANEOUS at 15:15

## 2022-12-08 RX ADMIN — ENOXAPARIN SODIUM 40 MG: 100 INJECTION SUBCUTANEOUS at 07:56

## 2022-12-08 RX ADMIN — SODIUM CHLORIDE, SODIUM LACTATE, POTASSIUM CHLORIDE, AND CALCIUM CHLORIDE: .6; .31; .03; .02 INJECTION, SOLUTION INTRAVENOUS at 09:46

## 2022-12-08 RX ADMIN — HYDROMORPHONE HYDROCHLORIDE 0.5 MG: 1 INJECTION, SOLUTION INTRAMUSCULAR; INTRAVENOUS; SUBCUTANEOUS at 14:13

## 2022-12-08 RX ADMIN — DEXAMETHASONE SODIUM PHOSPHATE 10 MG: 10 INJECTION, SOLUTION INTRAMUSCULAR; INTRAVENOUS at 09:46

## 2022-12-08 RX ADMIN — CEFAZOLIN 1000 MG: 1 INJECTION, POWDER, FOR SOLUTION INTRAMUSCULAR; INTRAVENOUS; PARENTERAL at 13:58

## 2022-12-08 RX ADMIN — SCOPALAMINE 1 PATCH: 1 PATCH, EXTENDED RELEASE TRANSDERMAL at 08:41

## 2022-12-08 RX ADMIN — PROPOFOL 200 MG: 10 INJECTION, EMULSION INTRAVENOUS at 09:46

## 2022-12-08 RX ADMIN — OXYCODONE HYDROCHLORIDE 5 MG: 5 TABLET ORAL at 16:27

## 2022-12-08 RX ADMIN — MIDAZOLAM 2 MG: 1 INJECTION INTRAMUSCULAR; INTRAVENOUS at 09:40

## 2022-12-08 RX ADMIN — HYDROMORPHONE HYDROCHLORIDE 0.5 MG: 1 INJECTION, SOLUTION INTRAMUSCULAR; INTRAVENOUS; SUBCUTANEOUS at 11:34

## 2022-12-08 RX ADMIN — ACETAMINOPHEN 975 MG: 325 TABLET ORAL at 07:56

## 2022-12-08 RX ADMIN — Medication 100 MG: at 09:46

## 2022-12-08 RX ADMIN — GABAPENTIN 300 MG: 300 CAPSULE ORAL at 07:56

## 2022-12-08 RX ADMIN — PROPOFOL 120 MCG/KG/MIN: 10 INJECTION, EMULSION INTRAVENOUS at 09:59

## 2022-12-08 RX ADMIN — FENTANYL CITRATE 50 MCG: 50 INJECTION, SOLUTION INTRAMUSCULAR; INTRAVENOUS at 14:50

## 2022-12-08 RX ADMIN — ONDANSETRON 4 MG: 2 INJECTION INTRAMUSCULAR; INTRAVENOUS at 14:18

## 2022-12-08 RX ADMIN — FENTANYL CITRATE 50 MCG: 50 INJECTION, SOLUTION INTRAMUSCULAR; INTRAVENOUS at 14:43

## 2022-12-08 RX ADMIN — LIDOCAINE HYDROCHLORIDE 50 MG: 10 INJECTION, SOLUTION EPIDURAL; INFILTRATION; INTRACAUDAL; PERINEURAL at 09:46

## 2022-12-08 RX ADMIN — CEFAZOLIN SODIUM 1000 MG: 1 SOLUTION INTRAVENOUS at 09:59

## 2022-12-08 RX ADMIN — HYDROMORPHONE HYDROCHLORIDE 0.25 MG: 1 INJECTION, SOLUTION INTRAMUSCULAR; INTRAVENOUS; SUBCUTANEOUS at 15:05

## 2022-12-08 RX ADMIN — REMIFENTANIL HYDROCHLORIDE 0.15 MCG/KG/MIN: 1 INJECTION, POWDER, LYOPHILIZED, FOR SOLUTION INTRAVENOUS at 09:59

## 2022-12-08 NOTE — ANESTHESIA PREPROCEDURE EVALUATION
Procedure:  ABDOMINOPLASTY (Abdomen)  REVISION OF BILATERAL MEDIAL BREAST STANDING CONE DEFORMITY (Bilateral: Breast)  LIPOSUCTION TO BACK & FLANKS (Back)    Relevant Problems   ANESTHESIA   (+) PONV (postoperative nausea and vomiting)      GI/HEPATIC   (+) Gastroesophageal reflux disease   (+) Hiatal hernia      /RENAL   (+) Chronic kidney disease   (+) Nephrolithiasis      GYN   (+) Breast cancer (HCC)      NEURO/PSYCH   (+) Anxiety   (+) Depression      Other   (+) Deformity of reconstructed breast       Latest Reference Range & Units 12/08/22 08:15   PREGNANCY TEST URINE Negative  Negative     Physical Exam    Airway    Mallampati score: II  TM Distance: >3 FB  Neck ROM: full     Dental   No notable dental hx     Cardiovascular      Pulmonary      Other Findings        Anesthesia Plan  ASA Score- 2     Anesthesia Type- general with ASA Monitors  Additional Monitors:   Airway Plan: ETT  Plan Factors-Exercise tolerance (METS): >4 METS  Chart reviewed  Existing labs reviewed  Patient summary reviewed  Patient is not a current smoker  Patient did not smoke on day of surgery  Induction- intravenous and rapid sequence induction  Postoperative Plan- Plan for postoperative opioid use  Planned trial extubation    Informed Consent- Anesthetic plan and risks discussed with patient  I personally reviewed this patient with the CRNA  Discussed and agreed on the Anesthesia Plan with the CRNA  Daryn Garcia

## 2022-12-08 NOTE — INTERVAL H&P NOTE
H&P reviewed  After examining the patient I find no changes in the patients condition since the H&P had been written      Vitals:    12/08/22 0722   BP: 139/85   Pulse: 100   Resp: 18   Temp: (!) 96 6 °F (35 9 °C)   SpO2: 97%

## 2022-12-08 NOTE — ANESTHESIA POSTPROCEDURE EVALUATION
Post-Op Assessment Note    CV Status:  Stable  Pain Score: 0    Pain management: adequate     Mental Status:  Alert and awake   Hydration Status:  Euvolemic   PONV Controlled:  Controlled   Airway Patency:  Patent      Post Op Vitals Reviewed: Yes      Staff: CRNA, Anesthesiologist         No notable events documented      BP      Temp     Pulse     Resp      SpO2

## 2022-12-08 NOTE — DISCHARGE INSTR - AVS FIRST PAGE
Surgery Date: 12/8/2022                Patient: Kristen Talbert  Surgeon: Fanta Dorantes, DO     Postoperative Instructions   Abdominoplasty    Dressings:  [x] Skin glue was applied to your incision over absorbable sutures  You may feel small pieces of suture at the ends of your incision  [x] Remove dressing the second morning following your surgery and bathe as directed  Shower directly over your steristrips  [x] No dressings are required but you may cover the incision with gauze for comfort  [x] Wear your surgical binder at all times except while bathing  [x] Strip and record your ARIANA drain output as instructed  Be sure to bring the output log to your follow up appointment  [] Other instructions:     Bathing:  [x] Shower 48 hours after surgery  Allow soap and water to gently wash over the incision  No scrubbing  Gently pat dry and apply dressing as needed/instructed above  [x] No submerging incision in bathtub, pool, hot tub and/or lake  Activity:  [x] No heavy lifting (> 10lbs)  [x] No strenuous exercise  [x] Walking is mandatory daily  [x] Sleeping may be more comfortable with your head and knees elevated or in a recliner  [x] No driving until off pain medications and you have resumed full range of motion  Diet and Medication:  [x] Resume diet as tolerated  High protein diet is important for healing  Remain well hydrated with water and minimize sodium intake  [x] Resume preoperative medications  [x] Pain medications as prescribed  You may also begin to use ibuprofen 48 hours after surgery  [] Finish all antibiotics as prescribed  [x] Apply ice to area as needed for pain  Do not place ice directly on skin  Do not use heat  [] Other instructions: It is expected to have some bruising, swelling and mild oozing at the incision site and of the surrounding area  If there is more than you expect, an enlarging area or you suspect an infection, please call the office      Some patients may experience a low-grade fever after surgery  If it is above 100 4, please call the office  If you do not have a postoperative office appointment scheduled, please call the office today and let the staff know Dr Seda Fernandez needs to see you in 7 days  Please call [phone] (Dr Kendra Treviño office) or [phone] (Dr Kendra Treviño phone) after hours with any questions, concerns or changes        ARIANA Drain Output Log     Date Time Drain #1 Drain #2

## 2022-12-09 NOTE — OP NOTE
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]  Personal history of malignant neoplasm of breast [Z85 3]    Post-Op Diagnosis Codes:     * Encounter for cosmetic surgery [Z41 1]     * Deformity of reconstructed breast [N65 0]     * Personal history of malignant neoplasm of breast [Z85 3]    Procedure(s) (LRB):  ABDOMINOPLASTY (N/A)  REVISION OF LEFT MEDIAL BREAST STANDING CONE DEFORMITY (Left)  LIPOSUCTION TO BACK & FLANKS (N/A)    Specimen(s):  ID Type Source Tests Collected by Time Destination   1 : LEFT BREAST SKIN Tissue Breast, Left TISSUE EXAM [facility] 2022 1336        Estimated Blood Loss:   Minimal    Drains:  Closed/Suction Drain Left Breast Bulb 10 Fr  (Active)   Number of days: 249       Closed/Suction Drain Right Breast Bulb 10 Fr  (Active)   Number of days: 249       Closed/Suction Drain Right Abdomen Bulb 15 Fr  (Active)   Dressing Status Open to air 22 1800   Drainage Appearance Bloody 22 1800   Status To bulb suction 22 1800   Number of days: 1       Closed/Suction Drain Left Abdomen Bulb 15 Fr  (Active)   Dressing Status Open to air 22 1800   Drainage Appearance Bloody 22 1800   Status To bulb suction 22 1800   Number of days: 1       [REMOVED] Urethral Catheter Latex 16 Fr   (Removed)   Number of days: 0       Anesthesia Type:   General - TIVA    Operative Indications:  51 yo female presents with left medial standing cone deformity s/p complex revision breast reconstruction and unacceptable cosmetic appearance of her abdomen, back and flanks    Operative Findings:  1900cc of lipoaspirate from back and flanks  Full abdominoplasty with plication and significant repositioning of umbilicus  Excision of left medial standing cone deformity measuring 2 5x4 8 cm and complex closure measuring 5 2 cm    Complications:   None    Procedure and Technique:  The patient was seen preoperatively   The procedure, risks, benefits and alternatives were discussed   Informed consent was obtained   The patient was site marked preoperatively   The patient was brought to the operating room where she was positioned supine with all of her pressure points appropriately padded   Anesthesia commenced   She was then turned prone with all of her pressure points padded   A timeout was performed and verified      The patient was prepped and draped in usual sterile fashion   I infiltrated tumescent solution to her back and flanks   #3 and #4 Latisha microaire cannulas were used to perform suction assisted lipectomy   Once symmetry was noted by pinch test, the incisions were closed with a single 4-0 PDS suture       The patient was turned supine with all of her pressure points padded and she was reprepped and draped in usual sterile fashion   I remeasured and confirmed my markings   The umbilicus was incised and electrocautery was used to dissect down along the stalk to the fascia   The inferior transverse incision was made sharply and carried down to rectus fascia using electrocatuery   The dissection was then carried superiorly to the xiphoid and subcostal margins   Of note, cautious dissection proceeded in the region of her former transverse incision  The defect was copious irrigated and hemostasis was obtained     The diastasis was marked and plicated in a buried figure of eight layer followed by a running layer using 0 PDS   A TAP block was performed  The patient was placed in the beach chair position and placement of progressive tension suture commenced using 2-0 PDS  The flap was measured to ensure tension free closure    The superior flap incision was marked and made sharply followed by electrocautery to complete the dissection  The position of the umbilicus was marked and incised   It was inset to the fascia and the abdominal flap using 3-0 PDS at the 12 and 6 o'clock positions   Deirdre's layer was closed using 2-0 PDS   2, 15 Fr ARIANA drains were placed through the right and left aspects of the incision and secured in place using 2-0 nylon   The deep dermis was closed using 3-0 monocryl and the subcuticular layer was run using 4-0 PDS   The remainder of the umbilicus was inset using 3-0 monocryl along the deep dermis and running 5-0 monocryl circumferentially   The area was cleansed, dried and glue was applied to the transverse incision   The umbilicus was cannulated using xeroform       Lastly, I turned my attention to the marked area on the medial left breast   The skin and soft tissue excess were sharply excised  The surrounding tissue was undermined at least equal to the width of the defect to allow for improved contour and a tension free closure  The incision was then closed in layers using 3-0 monocryl for the deep dermis and 4-0 PDS to approximate the skin  The area was cleansed and glue was applied  A bra and binder were placed      The instrument, sponge and needle count was correct an verified prior to completion of the case      The patient emerged from anesthesia and was transferred to the recovery room in stable condition  Patient Disposition:  PACU       SIGNATURE: Deepajosé miguel DO Divine  DATE: December 9, 2022  TIME: 7:53 AM    No qualified plastic surgery resident was available for the case  The JAMES provided assistance with retraction and suturing

## 2022-12-12 NOTE — ANESTHESIA POSTPROCEDURE EVALUATION
Post-Op Assessment Note    CV Status:  Stable    Pain management: adequate     Mental Status:  Alert and awake   Hydration Status:  Euvolemic   PONV Controlled:  Controlled   Airway Patency:  Patent      Post Op Vitals Reviewed: Yes      Staff: Anesthesiologist, CRNA         No notable events documented      BP      Temp      Pulse     Resp      SpO2

## 2022-12-16 ENCOUNTER — OFFICE VISIT (OUTPATIENT)
Dept: PLASTIC SURGERY | Facility: CLINIC | Age: 47
End: 2022-12-16

## 2022-12-16 DIAGNOSIS — N65.0 DEFORMITY OF RECONSTRUCTED BREAST: Primary | ICD-10-CM

## 2022-12-22 ENCOUNTER — OFFICE VISIT (OUTPATIENT)
Dept: PLASTIC SURGERY | Facility: CLINIC | Age: 47
End: 2022-12-22

## 2022-12-22 DIAGNOSIS — N65.0 DEFORMITY OF RECONSTRUCTED BREAST: Primary | ICD-10-CM

## 2022-12-22 NOTE — PROGRESS NOTES
Assessment and Plan:  Liss Pham 50 y o  female s/p revision of left breast recon, abdominoplasty, liposuction    Healing well  Demonstrated and encouraged scar massage  Transitioned to compression garment  Continue umbilical cannulation  Moisturization  RTC in 2-4 weeks or sooneras needed should any issues arise  Subjective:  Doing well  Denies issues  Objective:  NAD, AAOx3  Incision C/D/I, sutures removed without difficulty, ARIANA output minimal, removed without difficulty, edema and ecchymosis continue to resolve    Andie Crisostomo, DO  Plastic and Reconstructive Surgery

## 2022-12-29 NOTE — PROGRESS NOTES
S/p revision of left breast recon and abdominoplasty with liposuction  Doing well  One ARIANA removed  Expectant edema ecchymosis  Incision c/d/i  Begin umbilical cannulation  Continue compression  Begin foam rolling of back  RTC in 1 week for suture removal     Andie Crisostomo, DO  Plastic and Reconstructive Surgery

## 2023-01-18 ENCOUNTER — OFFICE VISIT (OUTPATIENT)
Dept: PLASTIC SURGERY | Facility: CLINIC | Age: 48
End: 2023-01-18

## 2023-01-18 DIAGNOSIS — N65.0 DEFORMITY OF RECONSTRUCTED BREAST: Primary | ICD-10-CM

## 2023-01-22 NOTE — PROGRESS NOTES
Assessment and Plan:  Liss Pham 46 y o  female s/p revision of left breast recon, abdominoplasty    Healing well  Demonstrated and encouraged scar massage and foam rolling  Continue compressive bra  Aggressive abdominal compression  RTC in 6-8 weeks or as needed should any issues arise  Subjective:  Doing well  Denies issues  Objective:  NAD, AAOx3  Incision C/D/I, sutures ends snipped, no s/s of seroma, edema improving, still present centrally    Andie Crisostomo, DO  Plastic and Reconstructive Surgery

## 2023-05-17 ENCOUNTER — OFFICE VISIT (OUTPATIENT)
Dept: PLASTIC SURGERY | Facility: CLINIC | Age: 48
End: 2023-05-17

## 2023-05-17 DIAGNOSIS — N65.0 DEFORMITY OF RECONSTRUCTED BREAST: Primary | ICD-10-CM

## 2023-05-21 NOTE — PROGRESS NOTES
Patient seen and examined  Doing well  Excellent contour and implants soft, no rippling, incisions c/d/i  Abdominal contour excellent with small amount of lateral excess  Can address at patient's convenience  Andie Crisostomo, DO  Plastic and Reconstructive Surgery

## 2023-11-29 ENCOUNTER — TELEPHONE (OUTPATIENT)
Age: 48
End: 2023-11-29

## 2023-11-29 NOTE — TELEPHONE ENCOUNTER
Patient needs to r/s her scar revision with DR. Agnes Carpenter that she has on 12/7 as she is having Thyroid surgery that day. Sent a msg to [facility] who will give her a call to rs the surgery.

## 2024-02-20 ENCOUNTER — TELEPHONE (OUTPATIENT)
Age: 49
End: 2024-02-20

## 2024-02-20 NOTE — TELEPHONE ENCOUNTER
"Patient called to cancel \"cosmetic procedure scar revision NO CHARGE\". I reached out to in-office staff for support before rescheduling and was instructed to route back to clinical as Dr. Crisostomo has specific procedure days. Please reach out to patient to reschedule. Thank you!  "

## 2024-05-15 ENCOUNTER — COSMETIC (OUTPATIENT)
Dept: PLASTIC SURGERY | Facility: CLINIC | Age: 49
End: 2024-05-15

## 2024-05-15 DIAGNOSIS — Z41.1 ENCOUNTER FOR COSMETIC SURGERY: Primary | ICD-10-CM

## 2024-05-15 PROCEDURE — RECHECK: Performed by: STUDENT IN AN ORGANIZED HEALTH CARE EDUCATION/TRAINING PROGRAM

## 2024-05-17 NOTE — PROGRESS NOTES
After informed consent was obtained, the small standing cone deformities on the abdominal incision was infiltrated with local anesthetic.  After adequate time to set, the areas were excised.  Hemostasis was obtained.  The area was closed using 3-0 monocryl for the deep dermis and 4-0 PDS to approximate the skin.    The area was cleansed.  Glue and steristrips were applied.    The patient tolerated the procedure well.    Andie Crisostomo DO  Plastic and Reconstructive Surgery

## 2024-05-30 ENCOUNTER — TELEPHONE (OUTPATIENT)
Dept: OBGYN CLINIC | Facility: OTHER | Age: 49
End: 2024-05-30

## 2024-06-07 ENCOUNTER — TELEPHONE (OUTPATIENT)
Dept: OBGYN CLINIC | Facility: OTHER | Age: 49
End: 2024-06-07

## 2024-06-07 NOTE — TELEPHONE ENCOUNTER
I called and spoke with patient and she was not available to speak at that time she will call us back.

## 2024-06-26 ENCOUNTER — CLINICAL SUPPORT (OUTPATIENT)
Dept: OBGYN CLINIC | Facility: OTHER | Age: 49
End: 2024-06-26

## 2024-06-26 DIAGNOSIS — Z85.3 HISTORY OF BREAST CANCER: Primary | ICD-10-CM

## 2024-06-26 NOTE — PROGRESS NOTES
Mastectomy Bra Fitting Order Details    Nelli Herbert  1975  [id number]    Reason For Visit  Mastectomy bra and prosthesis     Precautions   History of breast cancer. Lymphedema on R side.     Subjective  Nelli wore a 36DD pre surgery but is now wearing a 38D. States that she is pleased with the symmetry of her breast but does prefer more projection bilaterally.  States that she likes to wear a molded cup and sports bra currently.     Surgery Type: Mastectomy with tissue expanders. Failed implant. Most recent implant exchange was  July 2022. Consult for fat transfer in the near future with Dr. Crisostomo      Lymph node removal yes    Date of surgery Dec 2019     Surgical side bilateral    Objective  Nelli appears symmetrical bilaterally but would benefit from a balance bilaterally to improve projection .     Assessment  Band - 20.5 x 2 = 41 + 1 = 42  Cup 22.5 x 2 = 45   Trial of Lucila, Mary, Aundrea ,Twila and Angeles with balanca natura 220 size 5 to achieve more natural shape    Plan  Ship to home remainder of order. Nelli was educated on the wear and care of her products.     Order Details   b/l mastectomy December 2019  Angeles XL x 1  Twila off white XL x 1  Aundrea XL x 1 blush  Lucila L x 1 blush - received on 6/26/24  Balance Natura size 5 style 220 x 2 - received on 6/26/24

## 2024-07-15 ENCOUNTER — OFFICE VISIT (OUTPATIENT)
Dept: PLASTIC SURGERY | Facility: CLINIC | Age: 49
End: 2024-07-15
Payer: COMMERCIAL

## 2024-07-15 DIAGNOSIS — N65.0 DEFORMITY OF RECONSTRUCTED BREAST: Primary | ICD-10-CM

## 2024-07-15 PROCEDURE — 99214 OFFICE O/P EST MOD 30 MIN: CPT | Performed by: STUDENT IN AN ORGANIZED HEALTH CARE EDUCATION/TRAINING PROGRAM

## 2024-07-16 NOTE — PROGRESS NOTES
Patient seen and examined.  Patient reports stopping all of her medication.  She is having issues with her calcium/parathyroid and is scheduled to see her surgeon again.  She also has new lung nodules which are being worked up.  She reports increased fullness of her upper abdomen.      Bilateral breast implants in place with baker I capsule, some area of increased palpability on right superior aspect and left medial aspect, excess skin/SQ tissue on the left lateral aspect.  Upper abdomen with visceral/intra-abdominal fullness, well healed incisions.    We had a long discussion regarding her abdominal contour and ways to address visceral or intra-abdominal fat.  I do however, think she has more pressing health issues.  I did recommend seeing Dr. Faulkner  I encouraged healthy lifestyle choices including diet and exercise (she currently has a broken foot and is in a boot).  She has been unhappy with her thoracic surgeon and [facility] as [facility] are all fantastic and I recommend them without reservation.  I will see her back in the fall to check in.    Andie Crisostomo, DO  Plastic and Reconstructive Surgery

## 2024-10-21 ENCOUNTER — OFFICE VISIT (OUTPATIENT)
Dept: PLASTIC SURGERY | Facility: CLINIC | Age: 49
End: 2024-10-21
Payer: COMMERCIAL

## 2024-10-21 DIAGNOSIS — Z90.13 ACQUIRED ABSENCE OF BILATERAL BREASTS AND NIPPLES: Primary | ICD-10-CM

## 2024-10-21 PROCEDURE — 99214 OFFICE O/P EST MOD 30 MIN: CPT | Performed by: STUDENT IN AN ORGANIZED HEALTH CARE EDUCATION/TRAINING PROGRAM
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
## 2024-11-01 ENCOUNTER — TELEPHONE (OUTPATIENT)
Dept: PLASTIC SURGERY | Facility: CLINIC | Age: 49
End: 2024-11-01

## 2024-11-01 NOTE — TELEPHONE ENCOUNTER
Rec'd call from [facility].    They are requesting a NEW breast MRI script W & W/O be faxed to [phone].    (She states patient had breast MRI w/o and radiologist is ordering repeat breast MRI w & w/o.)    Patient is scheduled for 11/7/2024.    Any questions, please return call to [phone].

## 2024-11-06 NOTE — TELEPHONE ENCOUNTER
Malika from [facility] called asking if we can fax over a new order for MRI to be with and without contrast she said the one we sent is just without and they don't do that it needs to say with and without be faxed at [phone]

## 2024-11-07 NOTE — TELEPHONE ENCOUNTER
Received a call from Swathi from [facility] asking if we can fax an MRI order because patient has an appointment with  them today and they still didn't receive the order yet. They called multiple times if you can please call them back  Their fax# [phone]  Thank you

## 2025-04-08 ENCOUNTER — OFFICE VISIT (OUTPATIENT)
Age: 50
End: 2025-04-08
Payer: COMMERCIAL

## 2025-04-08 DIAGNOSIS — F10.288 ALCOHOL DEPENDENCE WITH OTHER ALCOHOL-INDUCED DISORDER (HCC): ICD-10-CM

## 2025-04-08 DIAGNOSIS — R63.5 WEIGHT GAIN: Primary | ICD-10-CM

## 2025-04-08 DIAGNOSIS — E66.3 OVERWEIGHT WITH BODY MASS INDEX (BMI) OF 29 TO 29.9 IN ADULT: ICD-10-CM

## 2025-04-08 PROBLEM — F33.42 MAJOR DEPRESSIVE DISORDER, RECURRENT, IN FULL REMISSION (HCC): Status: ACTIVE | Noted: 2025-04-08

## 2025-04-08 PROBLEM — D02.22: Status: ACTIVE | Noted: 2025-04-08

## 2025-04-08 PROBLEM — Z17.0 MALIGNANT NEOPLASM OF OVERLAPPING SITES OF RIGHT BREAST IN FEMALE, ESTROGEN RECEPTOR POSITIVE (HCC): Chronic | Status: ACTIVE | Noted: 2019-01-14

## 2025-04-08 PROBLEM — I10 HYPERTENSION GOAL BP (BLOOD PRESSURE) < 130/80: Status: ACTIVE | Noted: 2020-01-02

## 2025-04-08 PROBLEM — C50.811 MALIGNANT NEOPLASM OF OVERLAPPING SITES OF RIGHT BREAST IN FEMALE, ESTROGEN RECEPTOR POSITIVE (HCC): Chronic | Status: ACTIVE | Noted: 2019-01-14

## 2025-04-08 PROBLEM — M81.0 OSTEOPOROSIS: Status: ACTIVE | Noted: 2023-08-22

## 2025-04-08 PROCEDURE — 99205 OFFICE O/P NEW HI 60 MIN: CPT | Performed by: OBSTETRICS & GYNECOLOGY

## 2025-04-08 RX ORDER — VONOPRAZAN FUMARATE 26.72 MG/1
TABLET ORAL
COMMUNITY

## 2025-04-08 RX ORDER — ARIPIPRAZOLE 10 MG/1
10 TABLET ORAL DAILY
COMMUNITY
Start: 2025-03-11

## 2025-04-08 RX ORDER — BUPROPION HYDROCHLORIDE 100 MG/1
TABLET, EXTENDED RELEASE ORAL
COMMUNITY
Start: 2025-04-04

## 2025-04-10 NOTE — PROGRESS NOTES
:  Assessment & Plan  Weight gain    Orders:    Testosterone; Future    DHEA-sulfate; Future    Aldosterone; Future    Cortisol Level, AM Specimen; Future    Overweight with body mass index (BMI) of 29 to 29.9 in adult         Alcohol dependence with other alcohol-induced disorder (HCC)         1) This was a lengthy visit spent discussing the HPATG (hypothalamus/pituitary/adrenal/thyroid/gonadal) axis and the impact that hormonal deviation in one gland can have on another. It is not uncommon for ovarian declined to coincide or invoke thyroid and adrenal dysfunction as well.  2) It is the adrenal dysfunction along with her poor nutritional choices and alcohol consumption that has impacted her weight. I did offer adrenal assessment with labs, she would like, complete with 24 hr salivary cortisol testing.   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 She would do well with a health , or even a therapist to help her navigate alcohol issues. Reassurance given that she has already made great progress!! Slow and steady wins the race.   4) Medical options to help reduce addiction tendencies discussed: Contrave in particular with naltrexone, she is already on the wellbutrin component. This would have to be used with great caution for withdrawal symptoms with alcohol. Not covered by insurance, left message on [employer] if she wanted to try compounded LDN on it's own, no response yet. Again, psychiatric input may be helpful here for guidance on addiction support. Obviously GLP-1 would be an ideal adjunct for this, maybe Zepbound could be tried instead of the Ozempic.   5) Follow up in 1-2 months to discuss hormone testing and have heart to heart talk about above suggestions.     This was a 60 minute visit with greater than 50% of time spent in face to face counseling and coordination of care        Nelli presents for hormone consult, her first visit with me; self  referred, does not see [facility] gyn.   Nelli is postmenopausal for at least 2 years. She complains of:  1) Weight gain, her chief complaint. Currently 175 #, BMI 29. 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 Now has reigned it in, now with 1100 leonel daily, just started exercise, has only lost 4 lbs in 1 month, thinks it should be more.   2) Tried Ozempic, but did not tolerate due gastroparesis. Phentermine did not help.   3) Hx of Breast Ca 2018 s/p double mastectomy/chemo followed by 14 reconstructive surgeries. S/p lung Ca in 2023 with  lobectomy.   PMHX: as above. Hyper PTH with kidney stones, pelvic kidney; s/p parathyroidectomy; hx of severe osteoporosis with rib fracture; GERD; HTN; depression  SHX as above  FHX: Mom spinal stenosis, osteo. MGM Breast Ca. MGF AMI. Dad COD AMI 67, Htn. PGM Breast CA. PGF ? 1 sister, Breast Ca; 2 half brothers, one with Trisomy 21. 3 sons: 21/14/13, ADHD    Review of Systems   Constitutional:  Positive for fatigue and unexpected weight change.   Gastrointestinal:  Positive for nausea and vomiting.   Endocrine: Negative.    Genitourinary: Negative.    Musculoskeletal: Negative.    Skin: Negative.    Neurological: Negative.    Psychiatric/Behavioral: Negative.          Physical Exam  Constitutional:       Appearance: Normal appearance. She is obese.   Neurological:      Mental Status: She is alert.

## 2025-04-23 ENCOUNTER — RESULTS FOLLOW-UP (OUTPATIENT)
Age: 50
End: 2025-04-23

## 2025-05-03 ENCOUNTER — RESULTS FOLLOW-UP (OUTPATIENT)
Age: 50
End: 2025-05-03

## 2025-08-05 ENCOUNTER — TELEMEDICINE (OUTPATIENT)
Age: 50
End: 2025-08-05
Payer: COMMERCIAL

## 2025-08-12 ENCOUNTER — TELEPHONE (OUTPATIENT)
Dept: OBGYN CLINIC | Facility: OTHER | Age: 50
End: 2025-08-12

## (undated) DEVICE — INTENDED FOR TISSUE SEPARATION, AND OTHER PROCEDURES THAT REQUIRE A SHARP SURGICAL BLADE TO PUNCTURE OR CUT.: Brand: BARD-PARKER SAFETY BLADES SIZE 11, STERILE

## (undated) DEVICE — SCD SEQUENTIAL COMPRESSION COMFORT SLEEVE MEDIUM KNEE LENGTH: Brand: KENDALL SCD

## (undated) DEVICE — CRADLE EXTREMITY UNIVERSAL CONTOURED

## (undated) DEVICE — STERILE POLYISOPRENE POWDER-FREE SURGICAL GLOVES WITH EMOLLIENT COATING: Brand: PROTEXIS

## (undated) DEVICE — NEPTUNE E-SEP SMOKE EVACUATION PENCIL, COATED, 70MM BLADE, PUSH BUTTON SWITCH: Brand: NEPTUNE E-SEP

## (undated) DEVICE — TUBING SUCTION 5MM X 12 FT

## (undated) DEVICE — SUT MONOCRYL 5-0 P-3 18 IN Y493G

## (undated) DEVICE — BULB SYRINGE,IRRIGATION WITH PROTECTIVE CAP: Brand: DOVER

## (undated) DEVICE — ARGYLE YANKAUER BULB TIP, NO VENT WITH TUBING 1/4” X 6’ (6 MM X 1.8 M): Brand: ARGYLE

## (undated) DEVICE — ABDOMINAL PAD: Brand: DERMACEA

## (undated) DEVICE — SUT MONOCRYL 3-0 PS-2 27 IN Y427H

## (undated) DEVICE — DRESSING BIOPATCH ANTIMICROBIAL 1 IN DISC

## (undated) DEVICE — ELECTRODE BLADE MOD E-Z CLEAN 2.5IN 6.4CM -0012M

## (undated) DEVICE — LIGHT GLOVE GREEN

## (undated) DEVICE — STAPLER INSORB SUBCUTICULAR 30 SINGLE USE

## (undated) DEVICE — 1820 FOAM BLOCK NEEDLE COUNTER: Brand: DEVON

## (undated) DEVICE — ADHESIVE SKIN HIGH VISCOSITY EXOFIN 1ML

## (undated) DEVICE — ADHESIVE SKIN CLOSR DERMABOND PRINEO

## (undated) DEVICE — DRAPE SHEET THREE QUARTER

## (undated) DEVICE — SUT PDS II 2-0 CT-1 27 IN Z339H

## (undated) DEVICE — TUBING ASPIRATION LIPOSUCTION SET 12FT

## (undated) DEVICE — TRAY FOLEY 16FR URIMETER SURESTEP

## (undated) DEVICE — SPONGE STICK WITH PVP-I: Brand: KENDALL

## (undated) DEVICE — DRAPE TOWEL: Brand: CONVERTORS

## (undated) DEVICE — SKIN MARKER DUAL TIP WITH RULER CAP, FLEXIBLE RULER AND LABELS: Brand: DEVON

## (undated) DEVICE — SPONGE LAP 18 X 18 IN STRL RFD

## (undated) DEVICE — SUT PDS II 4-0 PS-2 18 IN Z496G

## (undated) DEVICE — PROXIMATE SKIN STAPLERS (35 WIDE) CONTAINS 35 STAINLESS STEEL STAPLES (FIXED HEAD): Brand: PROXIMATE

## (undated) DEVICE — NEEDLE 21 G X 1 1/2 SAFETY

## (undated) DEVICE — SINGLE PORT MANIFOLD: Brand: NEPTUNE 2

## (undated) DEVICE — INTENDED FOR TISSUE SEPARATION, AND OTHER PROCEDURES THAT REQUIRE A SHARP SURGICAL BLADE TO PUNCTURE OR CUT.: Brand: BARD-PARKER ® SAFETYLOCK CARBON RIB-BACK BLADES

## (undated) DEVICE — DISPOSABLE OR TOWEL: Brand: CARDINAL HEALTH

## (undated) DEVICE — 450 ML BOTTLE OF 0.05% CHLORHEXIDINE GLUCONATE IN 99.95% STERILE WATER FOR IRRIGATION, USP AND APPLICATOR.: Brand: IRRISEPT ANTIMICROBIAL WOUND LAVAGE

## (undated) DEVICE — PACK UNIVERSAL DRAPES SUB-Q ICD

## (undated) DEVICE — SUT SILK 0 SH 30 IN K834H

## (undated) DEVICE — CANNULA 3MM MERCEDES 30CM 8MM PORT

## (undated) DEVICE — SYRINGE 10ML LL

## (undated) DEVICE — ASTOUND STANDARD SURGICAL GOWN, XXL: Brand: CONVERTORS

## (undated) DEVICE — 3M™ STERI-STRIP™ REINFORCED ADHESIVE SKIN CLOSURES, R1547, 1/2 IN X 4 IN (12 MM X 100 MM), 6 STRIPS/ENVELOPE: Brand: 3M™ STERI-STRIP™

## (undated) DEVICE — CHEST/BREAST DRAPE: Brand: CONVERTORS

## (undated) DEVICE — PENCIL SMOKE EVAC TELESCOPING W/TUBING

## (undated) DEVICE — SYRINGE 30ML LL

## (undated) DEVICE — TUBING INFILTRATION 9FT

## (undated) DEVICE — STERILE POLYISOPRENE POWDER-FREE SURGICAL GLOVES: Brand: PROTEXIS

## (undated) DEVICE — SUT PROLENE 2-0 SH 30 IN 8833H

## (undated) DEVICE — Device

## (undated) DEVICE — STANDARD SURGICAL GOWN, L: Brand: CONVERTORS

## (undated) DEVICE — BINDER ABDOMINAL 30-45 IN

## (undated) DEVICE — ADHESIVE SKIN CLSR DERMABOND NX

## (undated) DEVICE — CANNISTER WASTE IMPLOSION PROOF

## (undated) DEVICE — JACKSON-PRATT 100CC BULB RESERVOIR: Brand: CARDINAL HEALTH

## (undated) DEVICE — 3M™ TEGADERM™ TRANSPARENT FILM DRESSING FRAME STYLE, 1626W, 4 IN X 4-3/4 IN (10 CM X 12 CM), 50/CT 4CT/CASE: Brand: 3M™ TEGADERM™

## (undated) DEVICE — DRAIN HUBLESS 15FR 3 1/16IN

## (undated) DEVICE — 3M™ TEGADERM™ TRANSPARENT FILM DRESSING FRAME STYLE, 1624W, 2-3/8 IN X 2-3/4 IN (6 CM X 7 CM), 100/CT 4CT/CASE: Brand: 3M™ TEGADERM™

## (undated) DEVICE — NEEDLE BLUNT 18 G X 1 1/2IN

## (undated) DEVICE — POV-IOD SOLUTION 4OZ BT

## (undated) DEVICE — CANNULA 4MM MERCEDES 30CM 10MM PORT

## (undated) DEVICE — INTENDED FOR TISSUE SEPARATION, AND OTHER PROCEDURES THAT REQUIRE A SHARP SURGICAL BLADE TO PUNCTURE OR CUT.: Brand: BARD-PARKER SAFETY BLADES SIZE 15, STERILE

## (undated) DEVICE — SUT PDS II 0 CT-1 27 IN Z340H

## (undated) DEVICE — SUT ETHILON 2-0 FS 18 IN 664H

## (undated) DEVICE — OCCLUSIVE GAUZE STRIP,3% BISMUTH TRIBROMOPHENATE IN PETROLATUM BLEND: Brand: XEROFORM

## (undated) DEVICE — DRAIN JACKSON PRATT 10FR 7MM: Brand: CARDINAL HEALTH